# Patient Record
Sex: MALE | Race: WHITE
[De-identification: names, ages, dates, MRNs, and addresses within clinical notes are randomized per-mention and may not be internally consistent; named-entity substitution may affect disease eponyms.]

---

## 2019-07-10 ENCOUNTER — HOSPITAL ENCOUNTER (EMERGENCY)
Dept: HOSPITAL 72 - EMR | Age: 39
Discharge: HOME | End: 2019-07-10
Payer: MEDICAID

## 2019-07-10 VITALS — SYSTOLIC BLOOD PRESSURE: 145 MMHG | DIASTOLIC BLOOD PRESSURE: 90 MMHG

## 2019-07-10 VITALS — DIASTOLIC BLOOD PRESSURE: 99 MMHG | SYSTOLIC BLOOD PRESSURE: 152 MMHG

## 2019-07-10 VITALS — WEIGHT: 145 LBS | HEIGHT: 68 IN | BODY MASS INDEX: 21.98 KG/M2

## 2019-07-10 DIAGNOSIS — B20: ICD-10-CM

## 2019-07-10 DIAGNOSIS — R31.9: Primary | ICD-10-CM

## 2019-07-10 DIAGNOSIS — N30.01: ICD-10-CM

## 2019-07-10 DIAGNOSIS — K12.0: ICD-10-CM

## 2019-07-10 DIAGNOSIS — J34.89: ICD-10-CM

## 2019-07-10 LAB
APPEARANCE UR: CLEAR
APTT PPP: YELLOW S
GLUCOSE UR STRIP-MCNC: NEGATIVE MG/DL
KETONES UR QL STRIP: (no result)
LEUKOCYTE ESTERASE UR QL STRIP: (no result)
NITRITE UR QL STRIP: NEGATIVE
PH UR STRIP: 6 [PH] (ref 4.5–8)
PROT UR QL STRIP: (no result)
SP GR UR STRIP: 1.02 (ref 1–1.03)
UROBILINOGEN UR-MCNC: 4 MG/DL (ref 0–1)

## 2019-07-10 PROCEDURE — 81003 URINALYSIS AUTO W/O SCOPE: CPT

## 2019-07-10 PROCEDURE — 99283 EMERGENCY DEPT VISIT LOW MDM: CPT

## 2019-07-10 NOTE — EMERGENCY ROOM REPORT
History of Present Illness


General


Chief Complaint:  Male Urogenital Problems


Source:  Patient





Present Illness


HPI


38-year-old male presents to the emergency department complaining of hematuria 

x3 days.  Patient reports that his hematuria symptoms have decent severity he 

reports he was having left-sided flank pain that he rated 3 out of 10 in 

severity. He reports that his pain would come and go. Denies pain at this time.

  Denies fevers or chills.  Patient has a history of HIV he is currently taking 

antiretrovirals and states that his viral load is undetectable and his last CD4 

count was 750.  Patient denies nausea, vomiting, abdominal pain or tenderness.  

He denies penile discharge, genital lesions, swollen tender lymph nodes, 

dysuria or urinary frequency.  Patient is also complaining of a painful ulcer 

to the right side of his tongue x 5 days, and a 3-month history of increased 

rhinorrhea. Denies cough, neck pain/stiffness, HA or photophobia. Denies ulcers 

or rashes elsewhere in the mouth or on his skin.  He states he took Mucinex 

with no relief.


Allergies:  


Coded Allergies:  


     No Known Allergies (Unverified , 7/10/19)





Patient History


Past Medical History:  see triage record, HIV, other - Hepatitis C,  Syphilis


Past Surgical History:  none


Pertinent Family History:  none


Immunizations:  UTD


Reviewed Nursing Documentation:  PMH: Agreed; PSxH: Agreed





Nursing Documentation-PMH


Past Medical History:  No History, Except For





Review of Systems


All Other Systems:  negative except mentioned in HPI





Physical Exam





Vital Signs








  Date Time  Temp Pulse Resp B/P (MAP) Pulse Ox O2 Delivery O2 Flow Rate FiO2


 


7/10/19 16:14 98.2 98 20 152/99 (116) 99 Room Air  








Sp02 EP Interpretation:  reviewed, normal


General Appearance:  no apparent distress, alert, GCS 15, non-toxic


Head:  normocephalic, atraumatic


Eyes:  bilateral eye normal inspection, bilateral eye PERRL


ENT:  hearing grossly normal, normal pharynx, normal voice, nasal congestion - 

clear rhinorrhea bilaterally, other - apthous ulcer on the right side of the 

tongue. smooth well circumscribed border.


Neck:  full range of motion, no meningismus, no bony tend


Respiratory:  chest non-tender, lungs clear, normal breath sounds, no wheezing, 

speaking full sentences


Cardiovascular #1:  regular rate, rhythm


Gastrointestinal:  non tender, soft


Genitourinary:  normal inspection, no CVA tenderness


Musculoskeletal:  back normal, gait/station normal, normal range of motion, non-

tender


Neurologic:  alert, oriented x3, responsive, motor strength/tone normal, 

sensory intact, speech normal, grossly normal


Psychiatric:  judgement/insight normal


Lymphatic:  no adenopathy





Medical Decision Making


PA Attestation


Dr. Ryder is my supervising Physician whom patient management has been discussed 

with.


Diagnostic Impression:  


 Primary Impression:  


 Hematuria


 Qualified Codes:  R31.9 - Hematuria, unspecified


 Additional Impressions:  


 UTI (urinary tract infection)


 Qualified Codes:  N30.01 - Acute cystitis with hematuria


 Rhinorrhea


 Aphthous ulcer of tongue


ER Course


38-year-old male presents to the emergency department complaining of hematuria 

x3 days.  Patient reports that his hematuria symptoms have decent severity he 

reports he was having left-sided flank pain that he rated 3 out of 10 in 

severity. He reports that his pain would come and go. Denies pain at this time.

  Denies fevers or chills.  Patient has a history of HIV he is currently taking 

antiretrovirals and states that his viral load is undetectable and his last CD4 

count was 750.  Patient denies nausea, vomiting, abdominal pain or tenderness.  

He denies penile discharge, genital lesions, swollen tender lymph nodes, 

dysuria or urinary frequency.  Patient is also complaining of a painful ulcer 

to the right side of his tongue x 5 days, and a 3-month history of increased 

rhinorrhea. Denies cough, neck pain/stiffness, HA or photophobia. Denies ulcers 

or rashes elsewhere in the mouth or on his skin.  He states he took Mucinex 

with no relief.


 


Differential diagnosis included was not limited to renal stone, cystitis, 

aortic aneurysm, renal artery dissection, UTI just to name a few. 





Vital signs: are WNL, pt. is afebrile





H&PE are most consistent with possible passed renal calculi or UTI. pt. non-

toxic in appearance, NAD, no CVA tenderness





ORDERS:


 


-UA: some RBC's, elevated inflammatory markers and few bacteria with no squamous

- will treat as UTI





ED INTERVENTIONS: None required at this time. 





The patient is advised followup with his urologist  1-2 days.  Patient is 

advised to return if any worsening condition or if any changes in status that 

are concerning or increased difficulty voiding..





-I do not identify an emergent condition at this time. With current presentation

,  pt. is stable for close outpatient follow up and conservative treatment.  D/

w pt. to return promptly to ED with worsening or new symptoms.- Pt. verbalizes' 

understanding and agreement with proposed treatment plan.proposed treatment 

plan.





DISCHARGE: At this time pt. is stable for d/c to home. Will provide printed 

patient care instructions, and any necessary prescriptions. Care plan and 

follow up instructions have been discussed with the patient prior to discharge.





Labs








Test


  7/10/19


16:44


 


Urine Color Yellow 


 


Urine Appearance Clear 


 


Urine pH 6 (4.5-8.0) 


 


Urine Specific Gravity


  1.020


(1.005-1.035)


 


Urine Protein 1+ (NEGATIVE) 


 


Urine Glucose (UA)


  Negative


(NEGATIVE)


 


Urine Ketones 1+ (NEGATIVE) 


 


Urine Blood 2+ (NEGATIVE) 


 


Urine Nitrite


  Negative


(NEGATIVE)


 


Urine Bilirubin


  Negative


(NEGATIVE)


 


Urine Urobilinogen


  4 MG/DL


(0.0-1.0)


 


Urine Leukocyte Esterase 2+ (NEGATIVE) 


 


Urine RBC


  2-4 /HPF (0 -


0)


 


Urine WBC


  5-10 /HPF (0 -


0)


 


Urine Squamous Epithelial


Cells None /LPF


(NONE/OCC)


 


Urine Bacteria


  Few /HPF


(NONE)











Last Vital Signs








  Date Time  Temp Pulse Resp B/P (MAP) Pulse Ox O2 Delivery O2 Flow Rate FiO2


 


7/10/19 16:14 98.2 98 20 152/99 (116) 99 Room Air  








Status:  improved


Disposition:  HOME, SELF-CARE


Condition:  Stable


Scripts


Cetirizine Hcl* (ZYRTEC*) 10 Mg Tablet


10 MG ORAL DAILY, #30 TAB 0 Refills


   Prov: Peri Gonzalez         7/10/19 


Chlorhexidine Gluconate (CHLORHEXIDINE GLUCONATE) 473 Ml Mouthwash


15 ML MM TID, #473 ML


   Prov: Peri Gonzalez         7/10/19 


Cephalexin* (KEFLEX*) 500 Mg Capsule


500 MG ORAL EVERY 12 HOURS for 7 Days, #14 CAP 0 Refills


   Prov: Peri Gonzalez         7/10/19


Patient Instructions:  Hematuria, Adult, Urinary Tract Infection





Additional Instructions:  


Take medications as directed. 





 ** Follow up with a Primary Care Provider in 3-5 days, even if your symptoms 

have resolved. ** 





Return sooner to ED if new symptoms occur, or current symptoms become worse. 











- Please note that this Emergency Department Report was dictated using [employer] (MM) technology software, occasionally this can lead to 

erroneous entry secondary to interpretation by the dictation equipment.











Peri Gonzalez Jul 10, 2019 16:29

## 2019-07-10 NOTE — NUR
ED Nurse Note:



pt waslked in c/o recent hematuria after having pain to kidney area for a few 
months per pt.  pt also relates increased mucous and sore to tongue. pt was 
seen by marie delong. will continue to monitor.